# Patient Record
Sex: FEMALE | Race: WHITE | NOT HISPANIC OR LATINO | URBAN - METROPOLITAN AREA
[De-identification: names, ages, dates, MRNs, and addresses within clinical notes are randomized per-mention and may not be internally consistent; named-entity substitution may affect disease eponyms.]

---

## 2018-02-15 ENCOUNTER — OFFICE VISIT CONVERTED (OUTPATIENT)
Dept: FAMILY MEDICINE CLINIC | Facility: CLINIC | Age: 53
End: 2018-02-15
Attending: FAMILY MEDICINE

## 2018-10-05 ENCOUNTER — OFFICE VISIT CONVERTED (OUTPATIENT)
Dept: FAMILY MEDICINE CLINIC | Facility: CLINIC | Age: 53
End: 2018-10-05
Attending: FAMILY MEDICINE

## 2018-12-21 ENCOUNTER — OFFICE VISIT CONVERTED (OUTPATIENT)
Dept: FAMILY MEDICINE CLINIC | Facility: CLINIC | Age: 53
End: 2018-12-21
Attending: FAMILY MEDICINE

## 2020-01-07 ENCOUNTER — OFFICE VISIT CONVERTED (OUTPATIENT)
Dept: FAMILY MEDICINE CLINIC | Facility: CLINIC | Age: 55
End: 2020-01-07
Attending: FAMILY MEDICINE

## 2020-01-09 ENCOUNTER — HOSPITAL ENCOUNTER (OUTPATIENT)
Dept: MRI IMAGING | Facility: HOSPITAL | Age: 55
Discharge: HOME | End: 2020-01-09
Attending: FAMILY MEDICINE

## 2020-02-07 ENCOUNTER — HOSPITAL ENCOUNTER (OUTPATIENT)
Dept: OTHER | Facility: HOSPITAL | Age: 55
Discharge: HOME | End: 2020-02-07

## 2020-07-31 ENCOUNTER — OFFICE VISIT CONVERTED (OUTPATIENT)
Dept: FAMILY MEDICINE CLINIC | Facility: CLINIC | Age: 55
End: 2020-07-31
Attending: FAMILY MEDICINE

## 2020-07-31 ENCOUNTER — CONVERSION ENCOUNTER (OUTPATIENT)
Dept: FAMILY MEDICINE CLINIC | Facility: CLINIC | Age: 55
End: 2020-07-31

## 2021-04-11 ENCOUNTER — HOSPITAL ENCOUNTER (OUTPATIENT)
Dept: URGENT CARE | Facility: CLINIC | Age: 56
Discharge: HOME | End: 2021-04-11
Attending: NURSE PRACTITIONER

## 2021-05-13 NOTE — PROGRESS NOTES
"   Progress Note      Patient Name: Sole Naylor   Patient ID: [id number]   Sex: Female   YOB: 1965    Primary Care Provider: Aramis Berumen DO   Referring Provider: Helena Tate MD    Visit Date: July 31, 2020    Provider: Aramis Berumen DO   Location: [facility]   Location Address: [address] [place] [address]  [id number]   Location Phone: [phone]          Chief Complaint  · Follow up - Depression, Lumbar back pain, tobacco abuse  · medication refills      History Of Present Illness  Sole Naylor is a 55 year old /White female who presents for evaluation and treatment of: Depression. She states that her depression is worse, especially since her mother passed away. She has been out of her medication X 2 months.      She also has chronic back pain. She is currently going to Pain Management. THey offered injection, but is apprehensive. It is worse with prolonged driving.    She continues to smoke.       Past Medical History  Disease Name Date Onset Notes   Achilles rupture, left --  --    Depression --  --    Foot fracture, left 12/21/2018 --    Leg pain --  --    Lumbar back pain --  --    Mild episode of recurrent major depressive disorder 02/21/2017 --    Neck pain --  --    Numbness and tingling of both legs 10/25/2016 --    Tobacco abuse 10/25/2016 --    Tobacco abuse counseling 10/25/2016 We dicussed the long term risk of tobacco use. Will try Chantix.          Past Surgical History  Procedure Name Date Notes   Bladder Surgery 1977 \"enlarged bladder opening\"   Cholecystectomy 2001 --    Cyst Removal 2012 neck   Dilation and Curettage 1985 --    Tubal ligation --  --          Medication List  Name Date Started Instructions   amitriptyline 25 mg oral tablet 01/07/2020 take 1 tablet (25 mg) by oral route once daily at bedtime for 30 days   citalopram 20 mg oral tablet 01/07/2020 take 1 tablet (20 mg) by oral route once daily for 30 days " "  gabapentin 300 mg oral capsule  take 1 capsule by oral route daily   hydrocodone-acetaminophen 5-325 mg oral tablet  take 1 tablet by oral route every 6 hours as needed for pain   meloxicam oral  take 1 by oral route daily   Naprosyn 500 mg oral tablet 01/07/2020 take 1 tablet (500 mg) by oral route 2 times per day with food for 30 days         Allergy List  Allergen Name Date Reaction Notes   NO KNOWN DRUG ALLERGIES --  --  --          Family Medical History  Disease Name Relative/Age Notes   Stroke Father/   --    Heart Disease Father/   --    Chronic Obstructive Pulmonary Disease  --    Lung cancer Father/  Grandfather (maternal)/   --    *Unremarkable  --    Diabetes Brother/  Father/  Uncle/   --          Social History  Finding Status Start/Stop Quantity Notes   Alcohol Never --/-- --  --    Tobacco Current every day 16/-- 0.5 --          Immunizations  NameDate Admin Mfg Trade Name Lot Number Route Inj VIS Given VIS Publication   Euocajaej87/23/2017 SKB Fluarix, quadrivalent, preservative free [id number] NE NE 02/15/2018 08/15/2015   Comments: vaccine given at hospital in [facility]         Review of Systems  · Constitutional  o Admits  o : night sweats  o Denies  o : fatigue  · HENT  o Denies  o : headaches  · Cardiovascular  o Denies  o : chest pain, irregular heart beats, rapid heart rate  · Respiratory  o Admits  o : dyspnea on exertion  o Denies  o : shortness of breath, wheezing, cough  · Musculoskeletal  o Admits  o : back pain  · Psychiatric  o Admits  o : anxiety, depression  o Denies  o : suicidal ideation, homicidal ideation      Vitals  Date Time BP Position Site L\R Cuff Size HR RR TEMP (F) WT  HT  BMI kg/m2 BSA m2 O2 Sat HC       12/21/2018 01:42 /64 Sitting    104 - R 12 98.4 179lbs 0oz 5'  4\" 30.72 1.91 93 %    01/07/2020 02:17 /79 Sitting    100 - R   214lbs 16oz 5'  4\" 36.9 2.1 97 %    07/31/2020 09:05 /71 Sitting    95 - R  99.5 194lbs 4oz 5'  4\" 33.34 1.99 98 %  "         Physical Examination  · Constitutional  o Appearance  o : well-nourished, well developed, alert, in no acute distress, well-tended appearance  · Head and Face  o Head  o :   § Inspection  § : atraumatic, normocephalic  o Face  o :   § Inspection  § : no facial lesions  o HEENT  o : Unremarkable  · Eyes  o Conjunctivae  o : conjunctivae normal  o Sclerae  o : sclerae white  o Pupils and Irises  o : pupils equal and round, pupils reactive to light bilaterally  o Eyelids/Ocular Adnexae  o : eyelid appearance normal  · Ears, Nose, Mouth and Throat  o Ears  o :   § External Ears  § : appearance within normal limits, no lesions present  § Otoscopic Examination  § : tympanic membrane appearance within normal limits bilaterally without perforations, mobility normal  o Nose  o :   § External Nose  § : appearance normal  o Oral Cavity  o :   § Oral Mucosa  § : oral mucosa normal  § Lips  § : lip appearance normal  § Teeth  § : normal dentition for age  § Gums  § : gums pink, non-swollen, no bleeding present  § Tongue  § : tongue appearance normal  § Palate  § : hard palate normal, soft palate appearance normal  o Throat  o :   § Oropharynx  § : no inflammation or lesions present, tonsils within normal limits  · Neck  o Inspection/Palpation  o : normal appearance, no masses or tenderness, trachea midline  o Thyroid  o : gland size normal, nontender, no nodules or masses present on palpation  · Respiratory  o Respiratory Effort  o : breathing unlabored  o Auscultation of Lungs  o : normal breath sounds  · Cardiovascular  o Heart  o :   § Auscultation of Heart  § : regular rate, normal rhythm, no murmurs present  o Peripheral Vascular System  o :   § Extremities  § : no edema  · Lymphatic  o Neck  o : no lymphadenopathy           Assessment  · Mild episode of recurrent major depressive disorder     296.31/F33.0  will get her back on her medications  · Tobacco abuse     305.1/Z72.0  Encouraged her to quit  smoking  · Lumbar back pain     724.2/M54.5  care per Pain Management      Plan  · Orders  o ACO-39: Current medications updated and reviewed () - - 07/31/2020  · Medications  o amitriptyline 25 mg oral tablet   SIG: take 1 tablet (25 mg) by oral route once daily at bedtime for 30 days   DISP: (30) tablets with 5 refills  Adjusted on 07/31/2020     o citalopram 20 mg oral tablet   SIG: take 1 tablet (20 mg) by oral route once daily for 30 days   DISP: (30) tablets with 5 refills  Adjusted on 07/31/2020     o Medications have been Reconciled  o Transition of Care or Provider Policy  · Instructions  o Patient is taking medications as prescribed and doing well.   o Take all medications as prescribed/directed.  o Patient instructed/educated on their diet and exercise program.  o Patient was educated/instructed on their diagnosis, treatment and medications prior to discharge from the clinic today.  o Patient instructed to seek medical attention urgently for new or worsening symptoms.  o Call the office with any concerns or questions.  o Bring all medicines with their bottles to each office visit.  · Disposition  o Call or Return if symptoms worsen or persist.  o Return Visit Request in/on 2 months +/- 2 days ([id number]).            Electronically Signed by: Aramis Berumen DO -Author on July 31, 2020 09:24:00 AM

## 2021-05-15 VITALS
BODY MASS INDEX: 36.7 KG/M2 | HEART RATE: 100 BPM | WEIGHT: 215 LBS | OXYGEN SATURATION: 97 % | SYSTOLIC BLOOD PRESSURE: 124 MMHG | HEIGHT: 64 IN | DIASTOLIC BLOOD PRESSURE: 79 MMHG

## 2021-05-15 VITALS
DIASTOLIC BLOOD PRESSURE: 71 MMHG | WEIGHT: 194.25 LBS | HEART RATE: 95 BPM | HEIGHT: 64 IN | TEMPERATURE: 99.5 F | OXYGEN SATURATION: 98 % | BODY MASS INDEX: 33.16 KG/M2 | SYSTOLIC BLOOD PRESSURE: 117 MMHG

## 2021-05-16 VITALS
OXYGEN SATURATION: 93 % | DIASTOLIC BLOOD PRESSURE: 64 MMHG | HEIGHT: 64 IN | RESPIRATION RATE: 12 BRPM | TEMPERATURE: 98.4 F | WEIGHT: 179 LBS | SYSTOLIC BLOOD PRESSURE: 118 MMHG | HEART RATE: 104 BPM | BODY MASS INDEX: 30.56 KG/M2

## 2021-05-16 VITALS
HEART RATE: 102 BPM | DIASTOLIC BLOOD PRESSURE: 60 MMHG | HEIGHT: 64 IN | BODY MASS INDEX: 27.87 KG/M2 | SYSTOLIC BLOOD PRESSURE: 103 MMHG | WEIGHT: 163.25 LBS | OXYGEN SATURATION: 97 %

## 2021-05-16 VITALS
RESPIRATION RATE: 21 BRPM | BODY MASS INDEX: 27.49 KG/M2 | OXYGEN SATURATION: 100 % | SYSTOLIC BLOOD PRESSURE: 117 MMHG | WEIGHT: 161 LBS | HEART RATE: 87 BPM | HEIGHT: 64 IN | TEMPERATURE: 98.1 F | DIASTOLIC BLOOD PRESSURE: 65 MMHG